# Patient Record
Sex: MALE | Race: BLACK OR AFRICAN AMERICAN
[De-identification: names, ages, dates, MRNs, and addresses within clinical notes are randomized per-mention and may not be internally consistent; named-entity substitution may affect disease eponyms.]

---

## 2022-06-15 ENCOUNTER — APPOINTMENT (OUTPATIENT)
Dept: OBGYN | Facility: CLINIC | Age: 37
End: 2022-06-15
Payer: SELF-PAY

## 2022-06-15 DIAGNOSIS — Z13.71 ENCOUNTER FOR NONPROCREATIVE SCREENING FOR GENETIC DISEASE CARRIER STATUS: ICD-10-CM

## 2022-06-15 PROCEDURE — 36415 COLL VENOUS BLD VENIPUNCTURE: CPT

## 2022-06-20 PROBLEM — Z13.71 SCREENING FOR GENETIC DISEASE CARRIER STATUS: Status: ACTIVE | Noted: 2022-06-14

## 2022-06-22 PROBLEM — Z00.00 ENCOUNTER FOR PREVENTIVE HEALTH EXAMINATION: Status: ACTIVE | Noted: 2022-06-22

## 2022-06-24 ENCOUNTER — APPOINTMENT (OUTPATIENT)
Dept: ANTEPARTUM | Facility: CLINIC | Age: 37
End: 2022-06-24
Payer: SELF-PAY

## 2022-06-24 PROCEDURE — 36415 COLL VENOUS BLD VENIPUNCTURE: CPT

## 2024-04-02 ENCOUNTER — LABORATORY RESULT (OUTPATIENT)
Age: 39
End: 2024-04-02

## 2024-04-02 ENCOUNTER — APPOINTMENT (OUTPATIENT)
Dept: FAMILY MEDICINE | Facility: CLINIC | Age: 39
End: 2024-04-02
Payer: MEDICAID

## 2024-04-02 VITALS
TEMPERATURE: 97.4 F | HEIGHT: 75 IN | DIASTOLIC BLOOD PRESSURE: 80 MMHG | BODY MASS INDEX: 31.21 KG/M2 | WEIGHT: 251 LBS | SYSTOLIC BLOOD PRESSURE: 131 MMHG | OXYGEN SATURATION: 98 % | HEART RATE: 81 BPM

## 2024-04-02 DIAGNOSIS — Z13.29 ENCOUNTER FOR SCREENING FOR OTHER SUSPECTED ENDOCRINE DISORDER: ICD-10-CM

## 2024-04-02 DIAGNOSIS — Z13.220 ENCOUNTER FOR SCREENING FOR LIPOID DISORDERS: ICD-10-CM

## 2024-04-02 DIAGNOSIS — Z80.52 FAMILY HISTORY OF MALIGNANT NEOPLASM OF BLADDER: ICD-10-CM

## 2024-04-02 DIAGNOSIS — Z76.89 PERSONS ENCOUNTERING HEALTH SERVICES IN OTHER SPECIFIED CIRCUMSTANCES: ICD-10-CM

## 2024-04-02 DIAGNOSIS — Z12.5 ENCOUNTER FOR SCREENING FOR MALIGNANT NEOPLASM OF PROSTATE: ICD-10-CM

## 2024-04-02 DIAGNOSIS — Z11.4 ENCOUNTER FOR SCREENING FOR HUMAN IMMUNODEFICIENCY VIRUS [HIV]: ICD-10-CM

## 2024-04-02 PROCEDURE — 99203 OFFICE O/P NEW LOW 30 MIN: CPT | Mod: 25

## 2024-04-02 PROCEDURE — G0444 DEPRESSION SCREEN ANNUAL: CPT | Mod: 59

## 2024-04-02 PROCEDURE — 96160 PT-FOCUSED HLTH RISK ASSMT: CPT | Mod: 59

## 2024-04-02 NOTE — HISTORY OF PRESENT ILLNESS
[FreeTextEntry8] : Mr. DARLENE LOWERY is a 38 year old male who presents to the office to establish care.  Medical hx reviewed. Last seen by PCP one year ago. Hx of PreDM?  Wife is HIV+, pt has no hx of HIV.

## 2024-04-02 NOTE — HEALTH RISK ASSESSMENT
[PHQ-2 Negative - No further assessment needed] : PHQ-2 Negative - No further assessment needed [Yes] : Yes [Monthly or less (1 pt)] : Monthly or less (1 point) [1 or 2 (0 pts)] : 1 or 2 (0 points) [Never (0 pts)] : Never (0 points) [No] : In the past 12 months have you used drugs other than those required for medical reasons? No [Never] : Never [de-identified] : n/a [de-identified] : DERM (acne)

## 2024-04-02 NOTE — SIGNATURES
[TextEntry] : Kiki Tiwari D.O.  Family Medicine Physician [place] [address] [phone]

## 2024-04-02 NOTE — REVIEW OF SYSTEMS
[Dysuria] : no dysuria [Hematuria] : no hematuria [Joint Pain] : no joint pain [Back Pain] : no back pain [Skin Rash] : no skin rash [Headache] : no headache [Anxiety] : no anxiety [Depression] : no depression [Swollen Glands] : no swollen glands [Negative] : Gastrointestinal

## 2024-04-03 LAB
25(OH)D3 SERPL-MCNC: 20.1 NG/ML
ALBUMIN SERPL ELPH-MCNC: 4.5 G/DL
ALP BLD-CCNC: 101 U/L
ALT SERPL-CCNC: 28 U/L
ANION GAP SERPL CALC-SCNC: 14 MMOL/L
APPEARANCE: ABNORMAL
AST SERPL-CCNC: 25 U/L
BILIRUB SERPL-MCNC: 0.3 MG/DL
BILIRUBIN URINE: NEGATIVE
BLOOD URINE: NEGATIVE
BUN SERPL-MCNC: 7 MG/DL
CALCIUM SERPL-MCNC: 9 MG/DL
CHLORIDE SERPL-SCNC: 103 MMOL/L
CHOLEST SERPL-MCNC: 187 MG/DL
CO2 SERPL-SCNC: 23 MMOL/L
COLOR: YELLOW
CREAT SERPL-MCNC: 0.93 MG/DL
EGFR: 108 ML/MIN/1.73M2
FOLATE SERPL-MCNC: 13.9 NG/ML
GLUCOSE QUALITATIVE U: NEGATIVE MG/DL
GLUCOSE SERPL-MCNC: 85 MG/DL
HDLC SERPL-MCNC: 46 MG/DL
HIV1+2 AB SPEC QL IA.RAPID: NONREACTIVE
IRON SERPL-MCNC: 89 UG/DL
KETONES URINE: NEGATIVE MG/DL
LDLC SERPL CALC-MCNC: 132 MG/DL
LEUKOCYTE ESTERASE URINE: NEGATIVE
NITRITE URINE: NEGATIVE
NONHDLC SERPL-MCNC: 140 MG/DL
PH URINE: 5.5
POTASSIUM SERPL-SCNC: 4.8 MMOL/L
PROT SERPL-MCNC: 7.6 G/DL
PROTEIN URINE: NORMAL MG/DL
PSA SERPL-MCNC: 0.8 NG/ML
SODIUM SERPL-SCNC: 139 MMOL/L
SPECIFIC GRAVITY URINE: >1.03
T4 SERPL-MCNC: 9.1 UG/DL
TRIGL SERPL-MCNC: 45 MG/DL
TSH SERPL-ACNC: 1.36 UIU/ML
UROBILINOGEN URINE: 0.2 MG/DL
VIT B12 SERPL-MCNC: 713 PG/ML

## 2024-04-05 DIAGNOSIS — R82.90 UNSPECIFIED ABNORMAL FINDINGS IN URINE: ICD-10-CM

## 2024-04-08 LAB — URINE CYTOLOGY: NORMAL

## 2024-04-09 LAB
A PHAGOCYTOPH IGG TITR SER IF: NORMAL
B BURGDOR AB SER QL IA: 0.3 IV
B MICROTI IGG TITR SER: NORMAL
E CHAFFEENSIS IGG TITR SER IF: NORMAL

## 2024-04-30 DIAGNOSIS — J06.9 ACUTE UPPER RESPIRATORY INFECTION, UNSPECIFIED: ICD-10-CM

## 2024-04-30 RX ORDER — AMOXICILLIN AND CLAVULANATE POTASSIUM 500; 125 MG/1; MG/1
500-125 TABLET, FILM COATED ORAL
Qty: 14 | Refills: 0 | Status: ACTIVE | COMMUNITY
Start: 2024-04-30 | End: 1900-01-01

## 2024-05-06 PROBLEM — Z00.00 ENCOUNTER FOR PREVENTIVE HEALTH EXAMINATION: Status: ACTIVE | Noted: 2021-02-24

## 2024-05-07 ENCOUNTER — NON-APPOINTMENT (OUTPATIENT)
Age: 39
End: 2024-05-07

## 2024-05-07 ENCOUNTER — TRANSCRIPTION ENCOUNTER (OUTPATIENT)
Age: 39
End: 2024-05-07

## 2024-05-07 ENCOUNTER — APPOINTMENT (OUTPATIENT)
Dept: FAMILY MEDICINE | Facility: CLINIC | Age: 39
End: 2024-05-07
Payer: MEDICAID

## 2024-05-07 VITALS
OXYGEN SATURATION: 96 % | HEIGHT: 75 IN | HEART RATE: 100 BPM | TEMPERATURE: 97.7 F | RESPIRATION RATE: 16 BRPM | DIASTOLIC BLOOD PRESSURE: 75 MMHG | SYSTOLIC BLOOD PRESSURE: 116 MMHG

## 2024-05-07 DIAGNOSIS — Z13.1 ENCOUNTER FOR SCREENING FOR DIABETES MELLITUS: ICD-10-CM

## 2024-05-07 DIAGNOSIS — M62.830 MUSCLE SPASM OF BACK: ICD-10-CM

## 2024-05-07 DIAGNOSIS — Z13.0 ENCOUNTER FOR SCREENING FOR DISEASES OF THE BLOOD AND BLOOD-FORMING ORGANS AND CERTAIN DISORDERS INVOLVING THE IMMUNE MECHANISM: ICD-10-CM

## 2024-05-07 DIAGNOSIS — Z00.00 ENCOUNTER FOR GENERAL ADULT MEDICAL EXAMINATION W/OUT ABNORMAL FINDINGS: ICD-10-CM

## 2024-05-07 DIAGNOSIS — E78.00 PURE HYPERCHOLESTEROLEMIA, UNSPECIFIED: ICD-10-CM

## 2024-05-07 DIAGNOSIS — Z13.31 ENCOUNTER FOR SCREENING FOR DEPRESSION: ICD-10-CM

## 2024-05-07 PROCEDURE — 93000 ELECTROCARDIOGRAM COMPLETE: CPT | Mod: 59

## 2024-05-07 PROCEDURE — 99395 PREV VISIT EST AGE 18-39: CPT | Mod: 25

## 2024-05-07 PROCEDURE — 96160 PT-FOCUSED HLTH RISK ASSMT: CPT | Mod: 59

## 2024-05-07 PROCEDURE — 92552 PURE TONE AUDIOMETRY AIR: CPT

## 2024-05-07 PROCEDURE — G0444 DEPRESSION SCREEN ANNUAL: CPT | Mod: 59

## 2024-05-07 RX ORDER — TIZANIDINE HYDROCHLORIDE 2 MG/1
2 CAPSULE ORAL
Qty: 7 | Refills: 0 | Status: ACTIVE | COMMUNITY
Start: 2024-05-07 | End: 1900-01-01

## 2024-05-07 NOTE — PHYSICAL EXAM
[Supple] : supple [Pedal Pulses Present] : the pedal pulses are present [No Edema] : there was no peripheral edema [Soft] : abdomen soft [Non Tender] : non-tender [Non-distended] : non-distended [Normal Bowel Sounds] : normal bowel sounds [No Spinal Tenderness] : no spinal tenderness [Grossly Normal Strength/Tone] : grossly normal strength/tone [Normal] : affect was normal and insight and judgment were intact [de-identified] : L>R paraspinal lumbar muscle spasm

## 2024-05-07 NOTE — ASSESSMENT
[FreeTextEntry1] : Annual physical/wellness visit performed today Routine measurements including height, weight, BMI, and blood pressure performed. Medications reviewed and reconciled.  Tobacco, alcohol, and drug screening performed.  Annual depression screening performed.  Diet and exercise discussed.   In office EKG performed: NSR at 90bpm, NAD, normal intervals, no acute changes noted.  In office hearing test performed: passed b/l

## 2024-05-07 NOTE — HISTORY OF PRESENT ILLNESS
[Spouse] : spouse [FreeTextEntry1] : CPE [de-identified] : Mr. DARLENE LOWERY is a 38 year old male who presents to the office for CPE Reviewed and discussed lab results.  Diet and exercise reviewed.  C/o lower back muscle spasm for past few days, was lifting heavy bad of mulch. Has been applying heating pads with some relief.

## 2024-05-07 NOTE — REVIEW OF SYSTEMS
[Dysuria] : no dysuria [Hematuria] : no hematuria [Joint Pain] : no joint pain [Muscle Pain] : muscle pain [Back Pain] : back pain [Skin Rash] : no skin rash [Headache] : no headache [Anxiety] : no anxiety [Depression] : no depression [Swollen Glands] : no swollen glands [Negative] : Gastrointestinal

## 2024-05-07 NOTE — COUNSELING
[Fall prevention counseling provided] : Fall prevention counseling provided [Adequate lighting] : Adequate lighting [No throw rugs] : No throw rugs [Use proper foot wear] : Use proper foot wear [Behavioral health counseling provided] : Behavioral health counseling provided [Sleep ___ hours/day] : Sleep [unfilled] hours/day [Engage in a relaxing activity] : Engage in a relaxing activity [Plan in advance] : Plan in advance [Encouraged to maintain food diary] : Encouraged to maintain food diary [Encouraged to increase physical activity] : Encouraged to increase physical activity [Decrease Portions] : decrease portions [____ min/wk Activity] : [unfilled] min/wk activity [Keep Food Diary] : keep food diary [Good understanding] : Patient has a good understanding of disease, goals and obesity follow-up plan

## 2024-05-07 NOTE — HEALTH RISK ASSESSMENT
[Very Good] : ~his/her~  mood as very good [Yes] : Yes [1 or 2 (0 pts)] : 1 or 2 (0 points) [Never (0 pts)] : Never (0 points) [No] : In the past 12 months have you used drugs other than those required for medical reasons? No [No falls in past year] : Patient reported no falls in the past year [PHQ-2 Negative - No further assessment needed] : PHQ-2 Negative - No further assessment needed [de-identified] : n/a [de-identified] : UROLOGY [de-identified] : minimal  [de-identified] : admits to red meat, cheese in excess, fatty and fried foods at times.  [Change in mental status noted] : No change in mental status noted [None] : None [With Family] : lives with family [Employed] : employed [] :  [# Of Children ___] : has [unfilled] children [Feels Safe at Home] : Feels safe at home [Fully functional (bathing, dressing, toileting, transferring, walking, feeding)] : Fully functional (bathing, dressing, toileting, transferring, walking, feeding) [Fully functional (using the telephone, shopping, preparing meals, housekeeping, doing laundry, using] : Fully functional and needs no help or supervision to perform IADLs (using the telephone, shopping, preparing meals, housekeeping, doing laundry, using transportation, managing medications and managing finances) [Reports changes in hearing] : Reports no changes in hearing [Reports changes in vision] : Reports no changes in vision [Reports normal functional visual acuity (ie: able to read med bottle)] : Reports normal functional visual acuity [Never] : Never

## 2024-05-07 NOTE — SIGNATURES
[TextEntry] : Kiki Tiwari D.O.  Family Medicine [place] [address] [phone]

## 2024-05-08 RX ORDER — TIZANIDINE 2 MG/1
2 TABLET ORAL AT BEDTIME
Qty: 7 | Refills: 0 | Status: ACTIVE | COMMUNITY
Start: 2024-05-08 | End: 1900-01-01

## 2024-05-11 LAB
ESTIMATED AVERAGE GLUCOSE: 126 MG/DL
HBA1C MFR BLD HPLC: 6 %
HCT VFR BLD CALC: 45.1 %
HGB BLD-MCNC: 14.3 G/DL
MCHC RBC-ENTMCNC: 26.2 PG
MCHC RBC-ENTMCNC: 31.7 GM/DL
MCV RBC AUTO: 82.8 FL
PLATELET # BLD AUTO: 245 K/UL
RBC # BLD: 5.45 M/UL
RBC # FLD: 15.1 %
WBC # FLD AUTO: 6.67 K/UL

## 2024-05-14 ENCOUNTER — NON-APPOINTMENT (OUTPATIENT)
Age: 39
End: 2024-05-14

## 2024-05-15 ENCOUNTER — APPOINTMENT (OUTPATIENT)
Dept: UROLOGY | Facility: CLINIC | Age: 39
End: 2024-05-15
Payer: MEDICAID

## 2024-05-15 VITALS
DIASTOLIC BLOOD PRESSURE: 76 MMHG | SYSTOLIC BLOOD PRESSURE: 127 MMHG | HEART RATE: 81 BPM | HEIGHT: 75 IN | OXYGEN SATURATION: 94 % | BODY MASS INDEX: 31.21 KG/M2 | WEIGHT: 251 LBS

## 2024-05-15 DIAGNOSIS — N46.9 MALE INFERTILITY, UNSPECIFIED: ICD-10-CM

## 2024-05-15 DIAGNOSIS — N28.1 CYST OF KIDNEY, ACQUIRED: ICD-10-CM

## 2024-05-15 DIAGNOSIS — Q64.4 MALFORMATION OF URACHUS: ICD-10-CM

## 2024-05-15 PROCEDURE — 99204 OFFICE O/P NEW MOD 45 MIN: CPT

## 2024-05-15 NOTE — HISTORY OF PRESENT ILLNESS
[FreeTextEntry1] : Mr. DARLENE LOWERY 38 year old  M no PMH and PSH bladder surgery as a child ( he does not recall but was told it can increase risk of bladder cancer, my be urachus put pt not sure). Pt comes in follow up for a general check, crystals in urine, renal cyst, enlarged prostate. Pt feels well and offers no complaints. Nonsmoker. Uncle prostate cancer, Aunt bladder cancer. Urination is good. Erections are good. Pt has had issues with infertility for a year. Pt does have a 17 month old now. Pt would like to get his semen tested.   4/8/24 US 2.1cm left renal cyst PVR 5cc Prostate 39mL  4/2/24 Urine cytology negative  4/3/24 PSA 0.80

## 2024-05-16 LAB
APPEARANCE: CLEAR
BACTERIA: NEGATIVE /HPF
BILIRUBIN URINE: NEGATIVE
BLOOD URINE: NEGATIVE
CAST: 1 /LPF
COLOR: YELLOW
EPITHELIAL CELLS: 0 /HPF
GLUCOSE QUALITATIVE U: NEGATIVE MG/DL
KETONES URINE: NEGATIVE MG/DL
LEUKOCYTE ESTERASE URINE: NEGATIVE
MICROSCOPIC-UA: NORMAL
NITRITE URINE: NEGATIVE
PH URINE: 5.5
PROTEIN URINE: NEGATIVE MG/DL
RED BLOOD CELLS URINE: 2 /HPF
SPECIFIC GRAVITY URINE: 1.03
UROBILINOGEN URINE: 0.2 MG/DL
WHITE BLOOD CELLS URINE: 1 /HPF

## 2024-05-17 LAB — BACTERIA UR CULT: NORMAL

## 2024-06-11 ENCOUNTER — NON-APPOINTMENT (OUTPATIENT)
Age: 39
End: 2024-06-11

## 2024-06-12 ENCOUNTER — APPOINTMENT (OUTPATIENT)
Dept: UROLOGY | Facility: CLINIC | Age: 39
End: 2024-06-12

## 2024-08-29 ENCOUNTER — APPOINTMENT (OUTPATIENT)
Dept: FAMILY MEDICINE | Facility: CLINIC | Age: 39
End: 2024-08-29
Payer: COMMERCIAL

## 2024-08-29 DIAGNOSIS — J02.9 ACUTE PHARYNGITIS, UNSPECIFIED: ICD-10-CM

## 2024-08-29 DIAGNOSIS — J06.9 ACUTE UPPER RESPIRATORY INFECTION, UNSPECIFIED: ICD-10-CM

## 2024-08-29 PROCEDURE — G2211 COMPLEX E/M VISIT ADD ON: CPT | Mod: 95

## 2024-08-29 PROCEDURE — 99213 OFFICE O/P EST LOW 20 MIN: CPT | Mod: 95

## 2024-08-29 RX ORDER — PREDNISONE 5 MG/1
5 TABLET ORAL
Qty: 10 | Refills: 0 | Status: ACTIVE | COMMUNITY
Start: 2024-08-29 | End: 1900-01-01

## 2024-08-29 RX ORDER — AMOXICILLIN AND CLAVULANATE POTASSIUM 500; 125 MG/1; MG/1
500-125 TABLET, FILM COATED ORAL
Qty: 20 | Refills: 0 | Status: ACTIVE | COMMUNITY
Start: 2024-08-29 | End: 1900-01-01

## 2024-08-29 NOTE — ASSESSMENT
[FreeTextEntry1] : Acute respiratory/pharyngitis Rx for Augmentin 500-125mg BID  Rx for prednisone 5mg sent C/w warm salt water gargles, hot teas.  Advised to wash your hands often, avoid close contact with others, cover your mouth and nose with a mask when around others, cover coughs and sneezes, clean & disinfect frequently touched surfaces. Monitor your health and symptoms daily. If symptoms worsen contact office.

## 2024-08-29 NOTE — SIGNATURES
[TextEntry] : Kiki Tiwari D.O.  Family Medicine [place] [address] [phone]

## 2024-08-29 NOTE — HISTORY OF PRESENT ILLNESS
Pt continues to have episodes of blood tinged urine then yellow clearer looking urine. bnp and ua in lab   [FreeTextEntry8] : Verbal consented obtained for telehealth visit (2 way audio/visual visit)  Patient located at home.  Provider located at medical office ([place], [place])  C/o acute symptoms, ache, chills, fatigue, sore throat, HA for one week, worsening especially sore throat. Hurts with swallowing Took otc cold medications- Tylenol cold and flu and warm fluids/hot teas with minimal relief.  Associated nasal congestion, cough- productive- white thick.  Denies chest pain, SOB, wheezing.

## 2024-09-11 ENCOUNTER — APPOINTMENT (OUTPATIENT)
Dept: DERMATOLOGY | Facility: CLINIC | Age: 39
End: 2024-09-11

## 2024-09-26 ENCOUNTER — APPOINTMENT (OUTPATIENT)
Dept: DERMATOLOGY | Facility: CLINIC | Age: 39
End: 2024-09-26

## 2025-01-13 ENCOUNTER — APPOINTMENT (OUTPATIENT)
Dept: FAMILY MEDICINE | Facility: CLINIC | Age: 40
End: 2025-01-13
Payer: COMMERCIAL

## 2025-01-13 VITALS
SYSTOLIC BLOOD PRESSURE: 127 MMHG | HEIGHT: 75 IN | TEMPERATURE: 98.4 F | HEART RATE: 97 BPM | OXYGEN SATURATION: 96 % | DIASTOLIC BLOOD PRESSURE: 86 MMHG | WEIGHT: 251 LBS | BODY MASS INDEX: 31.21 KG/M2

## 2025-01-13 DIAGNOSIS — S46.009A UNSPECIFIED INJURY OF MUSCLE(S) AND TENDON(S) OF THE ROTATOR CUFF OF UNSPECIFIED SHOULDER, INITIAL ENCOUNTER: ICD-10-CM

## 2025-01-13 PROCEDURE — 99202 OFFICE O/P NEW SF 15 MIN: CPT

## 2025-01-13 PROCEDURE — G2211 COMPLEX E/M VISIT ADD ON: CPT

## 2025-01-13 RX ORDER — MELOXICAM 15 MG/1
15 TABLET ORAL
Qty: 10 | Refills: 0 | Status: ACTIVE | COMMUNITY
Start: 2025-01-13 | End: 1900-01-01